# Patient Record
Sex: FEMALE | Race: BLACK OR AFRICAN AMERICAN | URBAN - METROPOLITAN AREA
[De-identification: names, ages, dates, MRNs, and addresses within clinical notes are randomized per-mention and may not be internally consistent; named-entity substitution may affect disease eponyms.]

---

## 2017-09-26 ENCOUNTER — OFFICE VISIT (OUTPATIENT)
Dept: FAMILY MEDICINE CLINIC | Age: 42
End: 2017-09-26

## 2017-09-26 VITALS
HEART RATE: 73 BPM | TEMPERATURE: 98.3 F | HEIGHT: 69 IN | OXYGEN SATURATION: 100 % | WEIGHT: 293 LBS | RESPIRATION RATE: 16 BRPM | BODY MASS INDEX: 43.4 KG/M2 | DIASTOLIC BLOOD PRESSURE: 83 MMHG | SYSTOLIC BLOOD PRESSURE: 133 MMHG

## 2017-09-26 DIAGNOSIS — G89.29 CHRONIC PAIN OF LEFT KNEE: Primary | ICD-10-CM

## 2017-09-26 DIAGNOSIS — M25.562 CHRONIC PAIN OF LEFT KNEE: Primary | ICD-10-CM

## 2017-09-26 NOTE — PROGRESS NOTES
You may qualify for a Free Mammogram and/or Pap Smear from [facility]. Please call [phone] to start the screening process so you can be scheduled for these important services. The process cannot begin until you make the call to begin screening. Chief Complaint   Patient presents with    Knee Pain     left      1. When and where did you last receive medical care? Yes Where: [facility] 2011    2. When and where did you last have preventive care such as mammogram, pap smears or colon screening? pap    3. What is your current living situation (for example, live alone, live in home with immediate family members)? family    3. Do you have any problems with communication such trouble seeing, hearing, or understanding instructions? No    5. Do you have an advance directive? This is a document that you can give to family members with instructions for how you would want them to make health care decisions for you if you were unable to speak for yourself. (For example, unconscious, delerious)No    PMH/FH/Social Hx reviewed and updated as needed     Applicable screenings reviewed and updated as needed  Medication reconciliation performed. Patient doesn't  need medication refills. Health Maintenance reviewed.

## 2017-09-26 NOTE — PROGRESS NOTES
HISTORY OF PRESENT ILLNESS  Yuli Dang is a 38 y.o. female. Knee Pain   The history is provided by the patient. This is a chronic problem. Episode onset: Presents with cc of L knee pain. Reports injury as a teen but no treatment. Pain is posterior and intermittant. Aggravated by cold or movement. No relieving factors. , Episode frequency: No radiation of pain. Occasional click/pop. No locking or giving out. The problem has not changed since onset. Review of Systems   Constitutional: Negative. Physical Exam   Constitutional: She appears well-developed and well-nourished. HENT:   Head: Normocephalic and atraumatic. Musculoskeletal:   L knee:  ROM wnl. Some peripatellar and lateral joint line tenderness. MCL/LCL grossly intact. Neg Lachman. Mildly positive MacMurray exam over the medial meniscus. Exam limited by lack of facilities. ASSESSMENT and PLAN  L knee pain: Start with quad strengthening/stretching. If not improving, would consider joint injection with imaging to follow.

## 2017-09-26 NOTE — PROGRESS NOTES
Discharge instructions reviewed with patient    Medication list and understanding of medications reviewed with patient. OTC and herbal medications reviewed and added to med list if applicable  Barriers to adherence assessed. Guidance given regarding new medications this visit, including reason for taking this medicine, and common side effects.

## 2017-10-24 ENCOUNTER — OFFICE VISIT (OUTPATIENT)
Dept: FAMILY MEDICINE CLINIC | Age: 42
End: 2017-10-24

## 2017-10-24 VITALS
TEMPERATURE: 98 F | SYSTOLIC BLOOD PRESSURE: 128 MMHG | BODY MASS INDEX: 43.4 KG/M2 | HEART RATE: 74 BPM | WEIGHT: 293 LBS | HEIGHT: 69 IN | OXYGEN SATURATION: 99 % | DIASTOLIC BLOOD PRESSURE: 79 MMHG

## 2017-10-24 DIAGNOSIS — M25.562 CHRONIC PAIN OF LEFT KNEE: Primary | ICD-10-CM

## 2017-10-24 DIAGNOSIS — G89.29 CHRONIC PAIN OF LEFT KNEE: Primary | ICD-10-CM

## 2017-10-24 NOTE — PROGRESS NOTES
HISTORY OF PRESENT ILLNESS  Jorge Coyne is a 38 y.o. female. Knee Pain   The history is provided by the patient. This is a chronic problem. Episode onset: Presents for f/u of L knee pain. For the last month, she has been doing stretching/strengthening exercises w/o significant improvement. Overall, she reports she has less movement than she did previously. She did have a fall yesterday. No new symptoms. The problem occurs constantly. The problem has been gradually worsening. Review of Systems   Constitutional: Negative. Physical Exam   Constitutional: She appears well-developed and well-nourished. Musculoskeletal:   No change in exam from previous. ASSESSMENT and PLAN  L knee pain: Options discussed with her. Will schedule for knee injection. Recommend imaging if she does not get significant improvement from injection.

## 2017-11-08 ENCOUNTER — OFFICE VISIT (OUTPATIENT)
Dept: FAMILY MEDICINE CLINIC | Age: 42
End: 2017-11-08

## 2017-11-08 VITALS
HEIGHT: 69 IN | DIASTOLIC BLOOD PRESSURE: 82 MMHG | OXYGEN SATURATION: 96 % | TEMPERATURE: 98.6 F | RESPIRATION RATE: 16 BRPM | HEART RATE: 77 BPM | BODY MASS INDEX: 43.4 KG/M2 | SYSTOLIC BLOOD PRESSURE: 129 MMHG | WEIGHT: 293 LBS

## 2017-11-08 DIAGNOSIS — G89.29 CHRONIC PAIN OF LEFT KNEE: Primary | ICD-10-CM

## 2017-11-08 DIAGNOSIS — M25.562 CHRONIC PAIN OF LEFT KNEE: Primary | ICD-10-CM

## 2017-11-08 NOTE — MR AVS SNAPSHOT
Visit Information Date & Time Provider Department Dept. Phone Encounter #  
 11/8/2017  2:00 PM Rosa Ibrahim, [address] [facility] [id number] Your Appointments 11/8/2017  2:00 PM  
Follow Up with Rosa Ibrahim MD  
[facility]) Appt Note: Knee Injection; office cancelled; Appt. follow up  
 [address] [place]  
  
   
 [address] [phone] Upcoming Health Maintenance Date Due DTaP/Tdap/Td series (1 - Tdap) 7/7/1994 PAP AKA CERVICAL CYTOLOGY 8/15/2020 Allergies as of 11/8/2017  Review Complete On: 11/8/2017 By: Rosa Ibrahim MD  
 No Known Allergies Current Immunizations  Never Reviewed No immunizations on file. Not reviewed this visit You Were Diagnosed With   
  
 Codes Comments Chronic pain of left knee    -  Primary ICD-10-CM: M25.562, H35.36 ICD-9-CM: 719.46, 338.29 Vitals BP Pulse Temp Resp Height(growth percentile) Weight(growth percentile) 129/82 (BP 1 Location: Left arm, BP Patient Position: Sitting) 77 98.6 °F (37 °C) (Oral) 16 5' 9\" (1.753 m) 322 lb (146.1 kg) LMP SpO2 BMI OB Status Smoking Status 10/02/2017 (Approximate) 96% 47.55 kg/m2 Having regular periods Never Smoker Vitals History BMI and BSA Data Body Mass Index Body Surface Area  
 47.55 kg/m 2 2.67 m 2 Preferred Pharmacy Pharmacy Name Phone [facility] [address] [facility] [phone] Your Updated Medication List  
  
Notice  As of 11/8/2017 12:12 PM  
 You have not been prescribed any medications. We Performed the Following DRAIN/INJECT INTERMEDIATE JOINT/BURSA [id number] CPT(R)] Introducing [facility]!    
 [employer] introduces [email or web address] patient portal. Now you can access parts of your medical record, email your doctor's office, and request medication refills online. 1. In your internet browser, go to [email or web address] 2. Click on the First Time User? Click Here link in the Sign In box. You will see the New Member Sign Up page. 3. Enter your [facility] Access Code exactly as it appears below. You will not need to use this code after youve completed the sign-up process. If you do not sign up before the expiration date, you must request a new code. · [facility] Access Code: [email or web address] Expires: 2/6/2018 12:12 PM 
 
4. Enter the last four digits of your Social Security Number (xxxx) and Date of Birth (mm/dd/yyyy) as indicated and click Submit. You will be taken to the next sign-up page. 5. Create a [facility] ID. This will be your [facility] login ID and cannot be changed, so think of one that is secure and easy to remember. 6. Create a [facility] password. You can change your password at any time. 7. Enter your Password Reset Question and Answer. This can be used at a later time if you forget your password. 8. Enter your e-mail address. You will receive e-mail notification when new information is available in [address] 9. Click Sign Up. You can now view and download portions of your medical record. 10. Click the Download Summary menu link to download a portable copy of your medical information. If you have questions, please visit the Frequently Asked Questions section of the [employer] website. Remember, [employer] is NOT to be used for urgent needs. For medical emergencies, dial 911. Now available from your iPhone and Android! Please provide this summary of care documentation to your next provider. If you have any questions after today's visit, please call [phone].

## 2017-11-08 NOTE — PROGRESS NOTES
HISTORY OF PRESENT ILLNESS  Cindy Thorpe is a 38 y.o. female. Injection   The history is provided by the patient. This is a chronic problem. Episode onset: Presents for f/u of chronic L knee pain. Presenting for L knee injection today. No change in medical history since last visit. Review of Systems   Constitutional: Negative. Physical Exam   Constitutional: She appears well-developed and well-nourished. ASSESSMENT and PLAN  Chronic L knee pain: Risks/benefits and alternatives to knee injection were discussed with her at length and her questions were answered. Consent form was signed. Site of injection was verified. Area was prepped and draped in normal fashion. Using a 1 1/2 inch 22 g needle, 5 cc of 2% lidocaine and 60mg of kenalog were injected into the joint space from just distal to the inferior border of the patella. There was no blood loss. No complications. She tolerated the procedure well. On post injection exam, she noted better strength/support when using the knee. Pain had resolved. Post injection instructions were reviewed with her. F/u as needed.

## 2017-11-08 NOTE — PROGRESS NOTES
Discharge instructions reviewed with patient    Medication list and understanding of medications reviewed with patient. OTC and herbal medications reviewed and added to med list if applicable  Barriers to adherence assessed. Guidance given regarding new medications this visit, including reason for taking this medicine, and common side effects.     Injection done per DELPHINE Meyer given, After procedure instructions given by MD.

## 2021-12-14 ENCOUNTER — HOSPITAL ENCOUNTER (OUTPATIENT)
Dept: LAB | Age: 46
Discharge: HOME | End: 2021-12-14

## 2021-12-14 PROCEDURE — 87798 DETECT AGENT NOS DNA AMP: CPT

## 2021-12-18 LAB
A VAGINAE DNA VAG QL NAA+PROBE: NORMAL SCORE
BVAB2 DNA VAG QL NAA+PROBE: NORMAL SCORE
C ALBICANS DNA VAG QL NAA+PROBE: NEGATIVE
C GLABRATA DNA VAG QL NAA+PROBE: NEGATIVE
C TRACH RRNA SPEC QL NAA+PROBE: NEGATIVE
MEGA1 DNA VAG QL NAA+PROBE: NORMAL SCORE
N GONORRHOEA RRNA SPEC QL NAA+PROBE: NEGATIVE
SPECIMEN SOURCE: NORMAL
T VAGINALIS RRNA SPEC QL NAA+PROBE: NEGATIVE